# Patient Record
(demographics unavailable — no encounter records)

---

## 2025-02-12 NOTE — REVIEW OF SYSTEMS
[TextEntry] : Mentions headache, dry mouth, neck pain, radiating pain to left thumb, occasional right upper quadrant pain.

## 2025-02-12 NOTE — HISTORY OF PRESENT ILLNESS
[FreeTextEntry1] : Patient is seeing me today for the first time.  She is a 48-year-old woman who has recently suffered with migraines and hypertension.  She was told of migraines and hypertension several years ago by a migraine specialist and was treated with nadolol.  She was headache free for a number of years and nadolol was recently tapered.  Recently, she developed left shoulder pain and went to a chiropractor.  She had electrical stimulation.  Following chiropractic care, she developed a severe migraine, characterized by ocular aura and headache.  She sought medical care at her doctor's office in [place] and also in the emergency room.  Blood pressure was extremely high.  She was given assorted medications.  This past Saturday, she had a CAT scan of her head which was normal.  She is presently being treated with metoprolol, 25 mg twice a day.  She did not take metoprolol this morning.  She comes in for hypertension management and also to discuss further management of her migraines.  At this point, she has a mild headache.  This initial visit takes place over 1 hour.

## 2025-02-12 NOTE — HEALTH RISK ASSESSMENT
[Yes] : Yes [2 - 4 times a month (2 pts)] : 2-4 times a month (2 points) [1 or 2 (0 pts)] : 1 or 2 (0 points) [Never (0 pts)] : Never (0 points) [No] : In the past 12 months have you used drugs other than those required for medical reasons? No [Never] : Never [Patient reported mammogram was abnormal] : Patient reported mammogram was abnormal [Patient reported colonoscopy was normal] : Patient reported colonoscopy was normal [Audit-CScore] : 2 [MammogramDate] : 02/25 [ColonoscopyDate] : 03/24

## 2025-02-12 NOTE — PLAN
[FreeTextEntry1] : 1.  Essential hypertension.  Norvasc begun.  To take along with Toprol XL, 25 mg a day.  Follow-up will be in 2 weeks.  2.  Abnormal mammogram.  Records to be obtained.  To have further imaging at [facility].  3.  History of abdominal abscess.  Records to be obtained.  4.  History of tongue cancer.  Status post resection, radiation, chemotherapy.  Records to be obtained.  Xerostomia is a side effect.  5.  Hypothyroidism, treated.  No change in therapy.  6.  Migraines.  Has been on daily nadolol until recently.  Now on Toprol.  Uncertain if she needs migraine prophylaxis.  To be determined.  7.  Chronic neck pain.  Chiropractic treatment on hold.  Advised to use Tylenol as needed.  8.  Right upper quadrant pain.  May wish to perform CAT scan.  Up-to-date with colonoscopy.  Records to be obtained.

## 2025-02-12 NOTE — PHYSICAL EXAM
[TextEntry] : Blood pressure for myself measures about 140/108 measured on each side.  Chest is clear.  Abdomen is nontender, nondistended.  There is no hepatosplenomegaly.  There is no mass.  Bowel sounds are normal and active in all 4 quadrants.

## 2025-03-27 NOTE — PLAN
[FreeTextEntry1] : 1.  Essential hypertension.  Blood pressure is normal with addition of amlodipine, 5 mg a day; metoprolol, 25 mg a day.  Medications are renewed.  2.  Migraines with aura.  Doing well with metoprolol, 25 mg daily.  No change in therapy.  3.  Chronic neck pain.  Likely consequence of neck dissection.  Will refer to Dr. Hong Ngo.  4.  History of tongue cancer.  She under went surgery plus radiation in 2016.  5.  Hypothyroidism.  Synthroid renewed.  She will have follow-up with me in April at which point lab work will be done.

## 2025-03-27 NOTE — HISTORY OF PRESENT ILLNESS
[FreeTextEntry1] : Patient is here today for follow-up of hypertension, hypothyroidism, migraines, cervical radiculopathy and shoulder pain.  Since her last visit, she has not had migraines.  She has been taking her amlodipine, and tolerating it well.

## 2025-03-27 NOTE — PHYSICAL EXAM
[TextEntry] : Blood pressure measures 118/84.  Patient is unable to fully elevate left arm, but internal and external rotation are normal.

## 2025-05-16 NOTE — HEALTH RISK ASSESSMENT
[Yes] : Yes [2 - 4 times a month (2 pts)] : 2-4 times a month (2 points) [1 or 2 (0 pts)] : 1 or 2 (0 points) [Never (0 pts)] : Never (0 points) [No] : In the past 12 months have you used drugs other than those required for medical reasons? No [No falls in past year] : Patient reported no falls in the past year [0] : 2) Feeling down, depressed, or hopeless: Not at all (0) [PHQ-2 Negative - No further assessment needed] : PHQ-2 Negative - No further assessment needed [Audit-CScore] : 2 [MLV0Peyef] : 0 [Never] : Never [0-4] : 0-4 [Patient reported mammogram was normal] : Patient reported mammogram was normal [Patient reported PAP Smear was normal] : Patient reported PAP Smear was normal [Patient reported colonoscopy was normal] : Patient reported colonoscopy was normal [HIV Test offered] : HIV Test offered [MammogramDate] : 01/25 [PapSmearDate] : 01/22 [ColonoscopyDate] : 12/21

## 2025-05-16 NOTE — PLAN
[FreeTextEntry1] : 1.  Health maintenance.  Reviewed screening.  Will refer to Dr. Wright and Dr. Robertson.  Lab work ordered.  Will obtain records of prior Pap and prior colonoscopy.  2.  Achilles tendinitis.  Likely related to physical activity.  Counseling provided.  3.  Essential hypertension.  Blood pressure well-controlled with metoprolol, 25 mg a day.  This is sent to the pharmacy.  4.  Hypothyroidism.  Uncertain about dose of medication.  Will monitor and report to us her dose.  5.  Migraine with aura.  Well-controlled with prophylactic metoprolol.  6.  Right upper quadrant abdominal pain.  Symptoms are intermittent and self-limited.

## 2025-05-16 NOTE — PHYSICAL EXAM
[TextEntry] :  There are no skin lesions.  There is no adenopathy.  She is normocephalic, atraumatic.  Conjunctivae are pink.  Sclerae are anicteric.  Pupils are equal, round, and reactive to light and accommodation.  Extraocular motion is intact.  Funduscopic exam is unremarkable.  Neck is supple.  Thyroid is not enlarged.  There are no breast masses or discharge, either erect or supine.  Chest is clear.  There is no jugular venous distention.  Pulses are 2+, equal, without bruits.  S1 is normal.  S2 is physiologically split.  There are no murmurs or gallops.  Abdomen is nontender, nondistended.  There is no hepatosplenomegaly.  There are no masses.  Bowel sounds are normal and active in all 4 quadrants.  Neurologically, she is intact other than diminished sensation in left forearm in the radial distribution.

## 2025-06-11 NOTE — PHYSICAL EXAM
[Alert] : alert [Oriented x 3] : ~L oriented x 3 [FreeTextEntry3] : PE:   General: well-appearing, alert, in no acute distress Full body skin exam performed examining scalp, head, face, ears, eyes, mouth, neck, chest, back, abdomen, axilla, b/l arms, b/l forearms, b/l hands, b/l fingernails, b/l thighs, b/l legs, b/l feet, b/l toenails, groin, buttocks Pertinent findings include: -scattered light brown to dark brown colored <6mm papules and macules on the trunk and extremities -brown stuck on papules, plaques on the trunk and extremities -tan to brown macules on face, trunk, extremities

## 2025-06-11 NOTE — HISTORY OF PRESENT ILLNESS
[FreeTextEntry1] : Full skin check [de-identified] : 48 year old female patient presents today for full skin check. referred by Dr. Alejo  # Mole/skin check Concerns today: No new, changing, growing, bleeding, concerning skin lesions noticed Sunscreen use: yes daily and avoids sun exposure Hx of blistering sun burns: no Hx of Tanning Bed Use: no   Personal history of skin cancer: SCC on tongue 2016 s/p partial glossectomy, neck LN dissection, s/p RT and chemo treated at [facility] Dr. Abram Laws MSK follows with dentist regularly  Family history of skin cancer: no

## 2025-06-11 NOTE — ASSESSMENT
[FreeTextEntry1] : #Multiple benign nevi - chronic, stable - I discussed the chronic nature and course of the condition - Photoprotection discussed, recommend daily broad-spectrum sunscreen, SPF 30 or greater, UPF hat, clothing. - Pt educated on ABCDE of melanoma - Recommend self-skin exam and annual skin exam by MD - Pt instructed to return for new or changing lesions especially if any moles start to change, itch, or bleed  # Seborrheic keratosis, trunk/extremities   # Solar lentigines -chronic, stable -I discussed the chronic nature and course of the condition -counseled on benign nature -no treatment needed unless symptomatic  #Hx of SCC tongue continue routine follow up  RTC 1 year for TBSE, sooner PRN